# Patient Record
Sex: MALE | Race: WHITE | NOT HISPANIC OR LATINO | Employment: FULL TIME | URBAN - METROPOLITAN AREA
[De-identification: names, ages, dates, MRNs, and addresses within clinical notes are randomized per-mention and may not be internally consistent; named-entity substitution may affect disease eponyms.]

---

## 2021-09-06 LAB
ALBUMIN UR-MCNC: NEGATIVE MG/DL
ANION GAP SERPL CALCULATED.3IONS-SCNC: <1 MMOL/L (ref 3–14)
APPEARANCE UR: CLEAR
BASOPHILS # BLD AUTO: 0 10E3/UL (ref 0–0.2)
BASOPHILS NFR BLD AUTO: 0 %
BILIRUB UR QL STRIP: NEGATIVE
BUN SERPL-MCNC: 13 MG/DL (ref 7–30)
CALCIUM SERPL-MCNC: 9.2 MG/DL (ref 8.5–10.1)
CHLORIDE BLD-SCNC: 107 MMOL/L (ref 94–109)
CO2 SERPL-SCNC: 32 MMOL/L (ref 20–32)
COLOR UR AUTO: NORMAL
CREAT SERPL-MCNC: 1.08 MG/DL (ref 0.66–1.25)
EOSINOPHIL # BLD AUTO: 0.2 10E3/UL (ref 0–0.7)
EOSINOPHIL NFR BLD AUTO: 3 %
ERYTHROCYTE [DISTWIDTH] IN BLOOD BY AUTOMATED COUNT: 11.8 % (ref 10–15)
GFR SERPL CREATININE-BSD FRML MDRD: >90 ML/MIN/1.73M2
GLUCOSE BLD-MCNC: 95 MG/DL (ref 70–99)
GLUCOSE UR STRIP-MCNC: NEGATIVE MG/DL
HCT VFR BLD AUTO: 45.4 % (ref 40–53)
HGB BLD-MCNC: 15.3 G/DL (ref 13.3–17.7)
HGB UR QL STRIP: NEGATIVE
HOLD SPECIMEN: NORMAL
IMM GRANULOCYTES # BLD: 0 10E3/UL
IMM GRANULOCYTES NFR BLD: 0 %
KETONES UR STRIP-MCNC: NEGATIVE MG/DL
LEUKOCYTE ESTERASE UR QL STRIP: NEGATIVE
LYMPHOCYTES # BLD AUTO: 1.5 10E3/UL (ref 0.8–5.3)
LYMPHOCYTES NFR BLD AUTO: 25 %
MCH RBC QN AUTO: 31.3 PG (ref 26.5–33)
MCHC RBC AUTO-ENTMCNC: 33.7 G/DL (ref 31.5–36.5)
MCV RBC AUTO: 93 FL (ref 78–100)
MONOCYTES # BLD AUTO: 0.7 10E3/UL (ref 0–1.3)
MONOCYTES NFR BLD AUTO: 12 %
NEUTROPHILS # BLD AUTO: 3.5 10E3/UL (ref 1.6–8.3)
NEUTROPHILS NFR BLD AUTO: 60 %
NITRATE UR QL: NEGATIVE
NRBC # BLD AUTO: 0 10E3/UL
NRBC BLD AUTO-RTO: 0 /100
PH UR STRIP: 6 [PH] (ref 5–7)
PLATELET # BLD AUTO: 175 10E3/UL (ref 150–450)
POTASSIUM BLD-SCNC: 4.1 MMOL/L (ref 3.4–5.3)
RBC # BLD AUTO: 4.89 10E6/UL (ref 4.4–5.9)
SODIUM SERPL-SCNC: 139 MMOL/L (ref 133–144)
SP GR UR STRIP: 1.02 (ref 1–1.03)
UROBILINOGEN UR STRIP-MCNC: NORMAL MG/DL
WBC # BLD AUTO: 6 10E3/UL (ref 4–11)

## 2021-09-06 PROCEDURE — 81003 URINALYSIS AUTO W/O SCOPE: CPT | Performed by: EMERGENCY MEDICINE

## 2021-09-06 PROCEDURE — 36415 COLL VENOUS BLD VENIPUNCTURE: CPT | Performed by: EMERGENCY MEDICINE

## 2021-09-06 PROCEDURE — 80048 BASIC METABOLIC PNL TOTAL CA: CPT | Performed by: EMERGENCY MEDICINE

## 2021-09-06 PROCEDURE — 99285 EMERGENCY DEPT VISIT HI MDM: CPT | Mod: 25

## 2021-09-06 PROCEDURE — 85025 COMPLETE CBC W/AUTO DIFF WBC: CPT | Performed by: EMERGENCY MEDICINE

## 2021-09-06 PROCEDURE — C9803 HOPD COVID-19 SPEC COLLECT: HCPCS

## 2021-09-06 ASSESSMENT — MIFFLIN-ST. JEOR: SCORE: 2124.31

## 2021-09-07 ENCOUNTER — HOSPITAL ENCOUNTER (OUTPATIENT)
Facility: CLINIC | Age: 26
Setting detail: OBSERVATION
Discharge: HOME | End: 2021-09-07
Attending: EMERGENCY MEDICINE | Admitting: SURGERY
Payer: COMMERCIAL

## 2021-09-07 ENCOUNTER — ANESTHESIA EVENT (OUTPATIENT)
Dept: SURGERY | Facility: CLINIC | Age: 26
End: 2021-09-07
Payer: COMMERCIAL

## 2021-09-07 ENCOUNTER — APPOINTMENT (OUTPATIENT)
Dept: CT IMAGING | Facility: CLINIC | Age: 26
End: 2021-09-07
Attending: EMERGENCY MEDICINE
Payer: COMMERCIAL

## 2021-09-07 ENCOUNTER — ANESTHESIA (OUTPATIENT)
Dept: SURGERY | Facility: CLINIC | Age: 26
End: 2021-09-07
Payer: COMMERCIAL

## 2021-09-07 VITALS
SYSTOLIC BLOOD PRESSURE: 116 MMHG | HEART RATE: 53 BPM | WEIGHT: 242 LBS | OXYGEN SATURATION: 97 % | HEIGHT: 72 IN | DIASTOLIC BLOOD PRESSURE: 79 MMHG | TEMPERATURE: 97.3 F | BODY MASS INDEX: 32.78 KG/M2 | RESPIRATION RATE: 14 BRPM

## 2021-09-07 DIAGNOSIS — K35.30 ACUTE APPENDICITIS WITH LOCALIZED PERITONITIS, WITHOUT PERFORATION, ABSCESS, OR GANGRENE: ICD-10-CM

## 2021-09-07 LAB — SARS-COV-2 RNA RESP QL NAA+PROBE: NEGATIVE

## 2021-09-07 PROCEDURE — 370N000017 HC ANESTHESIA TECHNICAL FEE, PER MIN: Performed by: SURGERY

## 2021-09-07 PROCEDURE — 74177 CT ABD & PELVIS W/CONTRAST: CPT

## 2021-09-07 PROCEDURE — 88304 TISSUE EXAM BY PATHOLOGIST: CPT | Mod: TC | Performed by: SURGERY

## 2021-09-07 PROCEDURE — 258N000003 HC RX IP 258 OP 636: Performed by: EMERGENCY MEDICINE

## 2021-09-07 PROCEDURE — 710N000010 HC RECOVERY PHASE 1, LEVEL 2, PER MIN: Performed by: SURGERY

## 2021-09-07 PROCEDURE — 272N000001 HC OR GENERAL SUPPLY STERILE: Performed by: SURGERY

## 2021-09-07 PROCEDURE — 360N000076 HC SURGERY LEVEL 3, PER MIN: Performed by: SURGERY

## 2021-09-07 PROCEDURE — 250N000009 HC RX 250: Performed by: SURGERY

## 2021-09-07 PROCEDURE — 250N000011 HC RX IP 250 OP 636: Performed by: NURSE ANESTHETIST, CERTIFIED REGISTERED

## 2021-09-07 PROCEDURE — 250N000009 HC RX 250: Performed by: NURSE ANESTHETIST, CERTIFIED REGISTERED

## 2021-09-07 PROCEDURE — 250N000013 HC RX MED GY IP 250 OP 250 PS 637: Performed by: ANESTHESIOLOGY

## 2021-09-07 PROCEDURE — 999N000141 HC STATISTIC PRE-PROCEDURE NURSING ASSESSMENT: Performed by: SURGERY

## 2021-09-07 PROCEDURE — G0378 HOSPITAL OBSERVATION PER HR: HCPCS

## 2021-09-07 PROCEDURE — 87635 SARS-COV-2 COVID-19 AMP PRB: CPT | Performed by: EMERGENCY MEDICINE

## 2021-09-07 PROCEDURE — 250N000011 HC RX IP 250 OP 636: Performed by: EMERGENCY MEDICINE

## 2021-09-07 PROCEDURE — 44970 LAPAROSCOPY APPENDECTOMY: CPT | Performed by: SURGERY

## 2021-09-07 PROCEDURE — 96361 HYDRATE IV INFUSION ADD-ON: CPT

## 2021-09-07 PROCEDURE — 250N000013 HC RX MED GY IP 250 OP 250 PS 637: Performed by: SURGERY

## 2021-09-07 PROCEDURE — 96374 THER/PROPH/DIAG INJ IV PUSH: CPT | Mod: 59

## 2021-09-07 PROCEDURE — 710N000012 HC RECOVERY PHASE 2, PER MINUTE: Performed by: SURGERY

## 2021-09-07 PROCEDURE — 258N000003 HC RX IP 258 OP 636: Performed by: NURSE ANESTHETIST, CERTIFIED REGISTERED

## 2021-09-07 PROCEDURE — 250N000011 HC RX IP 250 OP 636: Performed by: ANESTHESIOLOGY

## 2021-09-07 RX ORDER — BUPIVACAINE HYDROCHLORIDE 5 MG/ML
INJECTION, SOLUTION EPIDURAL; INTRACAUDAL PRN
Status: DISCONTINUED | OUTPATIENT
Start: 2021-09-07 | End: 2021-09-07 | Stop reason: HOSPADM

## 2021-09-07 RX ORDER — OXYCODONE HYDROCHLORIDE 5 MG/1
5 TABLET ORAL
Status: DISCONTINUED | OUTPATIENT
Start: 2021-09-07 | End: 2021-09-07 | Stop reason: HOSPADM

## 2021-09-07 RX ORDER — HYDROMORPHONE HYDROCHLORIDE 1 MG/ML
.3-.5 INJECTION, SOLUTION INTRAMUSCULAR; INTRAVENOUS; SUBCUTANEOUS
Status: DISCONTINUED | OUTPATIENT
Start: 2021-09-07 | End: 2021-09-07 | Stop reason: HOSPADM

## 2021-09-07 RX ORDER — HYDROMORPHONE HCL IN WATER/PF 6 MG/30 ML
0.2 PATIENT CONTROLLED ANALGESIA SYRINGE INTRAVENOUS EVERY 5 MIN PRN
Status: DISCONTINUED | OUTPATIENT
Start: 2021-09-07 | End: 2021-09-07 | Stop reason: HOSPADM

## 2021-09-07 RX ORDER — ACETAMINOPHEN 325 MG/1
975 TABLET ORAL ONCE
Status: COMPLETED | OUTPATIENT
Start: 2021-09-07 | End: 2021-09-07

## 2021-09-07 RX ORDER — ONDANSETRON 2 MG/ML
4 INJECTION INTRAMUSCULAR; INTRAVENOUS EVERY 30 MIN PRN
Status: DISCONTINUED | OUTPATIENT
Start: 2021-09-07 | End: 2021-09-07 | Stop reason: HOSPADM

## 2021-09-07 RX ORDER — IBUPROFEN 200 MG
600 TABLET ORAL EVERY 6 HOURS PRN
COMMUNITY
Start: 2021-09-07 | End: 2023-02-17

## 2021-09-07 RX ORDER — FENTANYL CITRATE 50 UG/ML
25 INJECTION, SOLUTION INTRAMUSCULAR; INTRAVENOUS
Status: DISCONTINUED | OUTPATIENT
Start: 2021-09-07 | End: 2021-09-07 | Stop reason: HOSPADM

## 2021-09-07 RX ORDER — KETOROLAC TROMETHAMINE 30 MG/ML
INJECTION, SOLUTION INTRAMUSCULAR; INTRAVENOUS PRN
Status: DISCONTINUED | OUTPATIENT
Start: 2021-09-07 | End: 2021-09-07

## 2021-09-07 RX ORDER — SODIUM CHLORIDE, SODIUM LACTATE, POTASSIUM CHLORIDE, CALCIUM CHLORIDE 600; 310; 30; 20 MG/100ML; MG/100ML; MG/100ML; MG/100ML
INJECTION, SOLUTION INTRAVENOUS CONTINUOUS
Status: DISCONTINUED | OUTPATIENT
Start: 2021-09-07 | End: 2021-09-07 | Stop reason: HOSPADM

## 2021-09-07 RX ORDER — IOPAMIDOL 755 MG/ML
500 INJECTION, SOLUTION INTRAVASCULAR ONCE
Status: COMPLETED | OUTPATIENT
Start: 2021-09-07 | End: 2021-09-07

## 2021-09-07 RX ORDER — ACETAMINOPHEN 500 MG
1000 TABLET ORAL EVERY 6 HOURS PRN
COMMUNITY
Start: 2021-09-07 | End: 2023-02-17

## 2021-09-07 RX ORDER — PROCHLORPERAZINE MALEATE 10 MG
10 TABLET ORAL EVERY 6 HOURS PRN
Status: DISCONTINUED | OUTPATIENT
Start: 2021-09-07 | End: 2021-09-07 | Stop reason: HOSPADM

## 2021-09-07 RX ORDER — MEPERIDINE HYDROCHLORIDE 25 MG/ML
12.5 INJECTION INTRAMUSCULAR; INTRAVENOUS; SUBCUTANEOUS
Status: DISCONTINUED | OUTPATIENT
Start: 2021-09-07 | End: 2021-09-07 | Stop reason: HOSPADM

## 2021-09-07 RX ORDER — OXYCODONE HYDROCHLORIDE 5 MG/1
5 TABLET ORAL EVERY 4 HOURS PRN
Status: DISCONTINUED | OUTPATIENT
Start: 2021-09-07 | End: 2021-09-07 | Stop reason: HOSPADM

## 2021-09-07 RX ORDER — SODIUM CHLORIDE, SODIUM LACTATE, POTASSIUM CHLORIDE, CALCIUM CHLORIDE 600; 310; 30; 20 MG/100ML; MG/100ML; MG/100ML; MG/100ML
INJECTION, SOLUTION INTRAVENOUS CONTINUOUS PRN
Status: DISCONTINUED | OUTPATIENT
Start: 2021-09-07 | End: 2021-09-07

## 2021-09-07 RX ORDER — FENTANYL CITRATE 50 UG/ML
INJECTION, SOLUTION INTRAMUSCULAR; INTRAVENOUS PRN
Status: DISCONTINUED | OUTPATIENT
Start: 2021-09-07 | End: 2021-09-07

## 2021-09-07 RX ORDER — LIDOCAINE 40 MG/G
CREAM TOPICAL
Status: DISCONTINUED | OUTPATIENT
Start: 2021-09-07 | End: 2021-09-07 | Stop reason: HOSPADM

## 2021-09-07 RX ORDER — OXYCODONE HYDROCHLORIDE 5 MG/1
5-10 TABLET ORAL EVERY 4 HOURS PRN
Qty: 12 TABLET | Refills: 0 | Status: SHIPPED | OUTPATIENT
Start: 2021-09-07 | End: 2023-02-17

## 2021-09-07 RX ORDER — LIDOCAINE HYDROCHLORIDE 10 MG/ML
INJECTION, SOLUTION INFILTRATION; PERINEURAL PRN
Status: DISCONTINUED | OUTPATIENT
Start: 2021-09-07 | End: 2021-09-07

## 2021-09-07 RX ORDER — PROPOFOL 10 MG/ML
INJECTION, EMULSION INTRAVENOUS PRN
Status: DISCONTINUED | OUTPATIENT
Start: 2021-09-07 | End: 2021-09-07

## 2021-09-07 RX ORDER — ONDANSETRON 4 MG/1
4 TABLET, ORALLY DISINTEGRATING ORAL EVERY 30 MIN PRN
Status: DISCONTINUED | OUTPATIENT
Start: 2021-09-07 | End: 2021-09-07 | Stop reason: HOSPADM

## 2021-09-07 RX ORDER — ERTAPENEM 1 G/1
1 INJECTION, POWDER, LYOPHILIZED, FOR SOLUTION INTRAMUSCULAR; INTRAVENOUS EVERY 24 HOURS
Status: DISCONTINUED | OUTPATIENT
Start: 2021-09-07 | End: 2021-09-07 | Stop reason: HOSPADM

## 2021-09-07 RX ORDER — KETOROLAC TROMETHAMINE 30 MG/ML
15 INJECTION, SOLUTION INTRAMUSCULAR; INTRAVENOUS EVERY 6 HOURS PRN
Status: DISCONTINUED | OUTPATIENT
Start: 2021-09-07 | End: 2021-09-07 | Stop reason: HOSPADM

## 2021-09-07 RX ORDER — METOPROLOL TARTRATE 1 MG/ML
1-2 INJECTION, SOLUTION INTRAVENOUS EVERY 5 MIN PRN
Status: DISCONTINUED | OUTPATIENT
Start: 2021-09-07 | End: 2021-09-07 | Stop reason: HOSPADM

## 2021-09-07 RX ORDER — ACETAMINOPHEN 650 MG/1
650 SUPPOSITORY RECTAL ONCE
Status: DISCONTINUED | OUTPATIENT
Start: 2021-09-07 | End: 2021-09-07 | Stop reason: HOSPADM

## 2021-09-07 RX ORDER — ONDANSETRON 4 MG/1
4 TABLET, ORALLY DISINTEGRATING ORAL EVERY 6 HOURS PRN
Status: DISCONTINUED | OUTPATIENT
Start: 2021-09-07 | End: 2021-09-07 | Stop reason: HOSPADM

## 2021-09-07 RX ORDER — ACETAMINOPHEN 325 MG/1
650 TABLET ORAL
Status: DISCONTINUED | OUTPATIENT
Start: 2021-09-07 | End: 2021-09-07 | Stop reason: HOSPADM

## 2021-09-07 RX ORDER — PROCHLORPERAZINE 25 MG
25 SUPPOSITORY, RECTAL RECTAL EVERY 12 HOURS PRN
Status: DISCONTINUED | OUTPATIENT
Start: 2021-09-07 | End: 2021-09-07 | Stop reason: HOSPADM

## 2021-09-07 RX ORDER — DEXAMETHASONE SODIUM PHOSPHATE 4 MG/ML
INJECTION, SOLUTION INTRA-ARTICULAR; INTRALESIONAL; INTRAMUSCULAR; INTRAVENOUS; SOFT TISSUE PRN
Status: DISCONTINUED | OUTPATIENT
Start: 2021-09-07 | End: 2021-09-07

## 2021-09-07 RX ORDER — ONDANSETRON 2 MG/ML
4 INJECTION INTRAMUSCULAR; INTRAVENOUS EVERY 6 HOURS PRN
Status: DISCONTINUED | OUTPATIENT
Start: 2021-09-07 | End: 2021-09-07 | Stop reason: HOSPADM

## 2021-09-07 RX ORDER — ONDANSETRON 2 MG/ML
INJECTION INTRAMUSCULAR; INTRAVENOUS PRN
Status: DISCONTINUED | OUTPATIENT
Start: 2021-09-07 | End: 2021-09-07

## 2021-09-07 RX ADMIN — SODIUM CHLORIDE, SODIUM LACTATE, POTASSIUM CHLORIDE, CALCIUM CHLORIDE: 600; 310; 30; 20 INJECTION, SOLUTION INTRAVENOUS at 05:59

## 2021-09-07 RX ADMIN — ERTAPENEM SODIUM 1 G: 1 INJECTION, POWDER, LYOPHILIZED, FOR SOLUTION INTRAMUSCULAR; INTRAVENOUS at 02:07

## 2021-09-07 RX ADMIN — HYDROMORPHONE HYDROCHLORIDE 1 MG: 1 INJECTION, SOLUTION INTRAMUSCULAR; INTRAVENOUS; SUBCUTANEOUS at 06:18

## 2021-09-07 RX ADMIN — FENTANYL CITRATE 25 MCG: 50 INJECTION, SOLUTION INTRAMUSCULAR; INTRAVENOUS at 07:44

## 2021-09-07 RX ADMIN — DEXAMETHASONE SODIUM PHOSPHATE 4 MG: 4 INJECTION, SOLUTION INTRA-ARTICULAR; INTRALESIONAL; INTRAMUSCULAR; INTRAVENOUS; SOFT TISSUE at 05:38

## 2021-09-07 RX ADMIN — FENTANYL CITRATE 100 MCG: 50 INJECTION, SOLUTION INTRAMUSCULAR; INTRAVENOUS at 05:38

## 2021-09-07 RX ADMIN — ONDANSETRON HYDROCHLORIDE 4 MG: 2 INJECTION, SOLUTION INTRAVENOUS at 05:52

## 2021-09-07 RX ADMIN — SODIUM CHLORIDE 65 ML: 9 INJECTION, SOLUTION INTRAVENOUS at 00:59

## 2021-09-07 RX ADMIN — OXYCODONE HYDROCHLORIDE 5 MG: 5 TABLET ORAL at 07:40

## 2021-09-07 RX ADMIN — PROPOFOL 200 MG: 10 INJECTION, EMULSION INTRAVENOUS at 05:38

## 2021-09-07 RX ADMIN — SUGAMMADEX 200 MG: 100 INJECTION, SOLUTION INTRAVENOUS at 06:15

## 2021-09-07 RX ADMIN — KETOROLAC TROMETHAMINE 30 MG: 30 INJECTION, SOLUTION INTRAMUSCULAR at 06:15

## 2021-09-07 RX ADMIN — SODIUM CHLORIDE, SODIUM LACTATE, POTASSIUM CHLORIDE, CALCIUM CHLORIDE: 600; 310; 30; 20 INJECTION, SOLUTION INTRAVENOUS at 05:16

## 2021-09-07 RX ADMIN — IOPAMIDOL 100 ML: 755 INJECTION, SOLUTION INTRAVENOUS at 00:59

## 2021-09-07 RX ADMIN — MIDAZOLAM 2 MG: 1 INJECTION INTRAMUSCULAR; INTRAVENOUS at 05:31

## 2021-09-07 RX ADMIN — ROCURONIUM BROMIDE 50 MG: 10 INJECTION INTRAVENOUS at 05:38

## 2021-09-07 RX ADMIN — LIDOCAINE HYDROCHLORIDE 50 MG: 10 INJECTION, SOLUTION INFILTRATION; PERINEURAL at 05:38

## 2021-09-07 RX ADMIN — ACETAMINOPHEN 975 MG: 325 TABLET, FILM COATED ORAL at 07:40

## 2021-09-07 ASSESSMENT — ENCOUNTER SYMPTOMS
NAUSEA: 1
APPETITE CHANGE: 1
FEVER: 1
BACK PAIN: 0
ABDOMINAL PAIN: 1
VOMITING: 0

## 2021-09-07 ASSESSMENT — MIFFLIN-ST. JEOR: SCORE: 2110.7

## 2021-09-07 NOTE — ANESTHESIA CARE TRANSFER NOTE
Patient: Jason Sanchez    Procedure(s):  APPENDECTOMY, LAPAROSCOPIC    Diagnosis: Acute appendicitis with localized peritonitis, without perforation, abscess, or gangrene [K35.30]  Diagnosis Additional Information: No value filed.    Anesthesia Type:   General     Note:    Oropharynx: oropharynx clear of all foreign objects and spontaneously breathing  Level of Consciousness: awake  Oxygen Supplementation: face mask  Level of Supplemental Oxygen (L/min / FiO2): 6  Independent Airway: airway patency satisfactory and stable  Dentition: dentition unchanged  Vital Signs Stable: post-procedure vital signs reviewed and stable  Report to RN Given: handoff report given  Patient transferred to: PACU    Handoff Report: Identifed the Patient, Identified the Reponsible Provider, Reviewed the pertinent medical history, Discussed the surgical course, Reviewed Intra-OP anesthesia mangement and issues during anesthesia, Set expectations for post-procedure period and Allowed opportunity for questions and acknowledgement of understanding      Vitals:  Vitals Value Taken Time   /107 09/07/21 0629   Temp     Pulse 57 09/07/21 0631   Resp 8 09/07/21 0631   SpO2 99 % 09/07/21 0631   Vitals shown include unvalidated device data.    Electronically Signed By: KODAK Joyner CRNA  September 7, 2021  6:32 AM

## 2021-09-07 NOTE — ED PROVIDER NOTES
History     Chief Complaint:  Abdominal Pain      The history is provided by the patient.      Jason Sanchez is a 26 year old male who presents with right lower abdominal pain since 2 nights ago. The severity and location of the pain varies at times. He also notes that he has lost his appetite, and has had difficulty sleeping the past 2 nights due to the pain. His is currently a little nausea, but denies vomiting. He denies recent trauma to the abdomen or a history of abdominal surgeries. There is no association of urinary symptoms, testicular pain, or back pain.       Review of Systems   Constitutional: Positive for appetite change and fever.   Gastrointestinal: Positive for abdominal pain and nausea. Negative for vomiting.   Genitourinary: Negative.  Negative for testicular pain.   Musculoskeletal: Negative for back pain.   All other systems reviewed and are negative.        Allergies:  No Known Allergies    Medications:    Patient denies current use of medications.     Past Medical History:    ADHD   Varicella     Past Surgical History:    Forearm fracture surgery     Family History:    Brother - ADHD     Social History:  Patient was unaccompanied to the ED.  Hx of alcohol use.     Physical Exam     Patient Vitals for the past 24 hrs:   BP Temp Temp src Pulse Resp SpO2 Height Weight   09/06/21 2204 (!) 137/102 97.8  F (36.6  C) Temporal 68 18 97 % 1.829 m (6') 111.1 kg (245 lb)       Physical Exam  Gen: alert  HEENT: PERRL, oropharynx clear  Neck: normal ROM  CV: RRR, no murmurs  Pulm: breath sounds equal, lungs clear  Abd: Soft, Focal right abdominal tenderness.   Back: no evidence of injury, no cva tenderness  MSK: no deformity, moves all extremities  Skin: no rash  Neuro: alert, appropriate conversation and interaction    Emergency Department Course   Imaging:  CT Abdomen Pelvis w Contrast  Final Result  IMPRESSION:   1.  Appendix appears slightly dilated measuring 8 mm and appears slightly thick-walled  with some minimal adjacent edema. Although not definitive, findings could be seen with a mild or low-grade appendicitis in the appropriate setting. Clinical   correlation.  2.  No significant findings elsewhere.  Case discussed with Dr. Biswas 9/7/2021 1:31 AM.  Per Radiology     Laboratory:  BMP: Anion gap <1 (L) o/w WNL (Creatinine 1.08)     CBC: WBC 6, HGB 15.3,      UA with microscopic: WNL     Asymptomatic COVID-19 Virus (Coronavirus) by PCR Nasopharyngeal swab: Pending     Emergency Department Course:    Reviewed:  I reviewed nursing notes, vitals, past history and care everywhere    Assessments:  0003 I obtained history and examined the patient as noted above.    I rechecked the patient and explained findings.     Consults:   0130 I consulted with Radiology.   0137 I consulted with Dr. Vargas from surgery.     Interventions:  0059 Ns 65ml IV  0207 Invanz 1g IV  NS 1L iv    Disposition:  The patient was admitted to the hospital under the care of Dr. Vargas.    Impression & Plan    Medical Decision Making:  Jason Sanchez is a 26 year old male who presents with abdominal pain and the CT scan confirms appendicitis.  This is consistent with his clinical exam.  There is no evidence of rupture or abscess at this time. His pain has been controlled with interventions in the Emergency Department.  IV antibiotics started in the Emergency Department. The case was discussed with the on-call surgeon and the patient will be going to the operating room.      Covid-19  Jason Sanchez was evaluated during a global COVID-19 pandemic, which necessitated consideration that the patient might be at risk for infection with the SARS-CoV-2 virus that causes COVID-19.   Applicable protocols for evaluation were followed during the patient's care.   COVID-19 was considered as part of the patient's evaluation. The plan for testing is:  a test was obtained during this visit.    Diagnosis:    ICD-10-CM    1. Acute appendicitis  with localized peritonitis, without perforation, abscess, or gangrene  K35.30 Case Request: APPENDECTOMY, LAPAROSCOPIC     Case Request: APPENDECTOMY, LAPAROSCOPIC       Scribe Disclosure:  I, Adan James, am serving as a scribe at 12:03 AM on 9/7/2021 to document services personally performed by Nela Biswas MD based on my observations and the provider's statements to me.      Nela Biswas MD  09/07/21 0908

## 2021-09-07 NOTE — ANESTHESIA POSTPROCEDURE EVALUATION
Patient: Jason Sanchez    Procedure(s):  APPENDECTOMY, LAPAROSCOPIC    Diagnosis:Acute appendicitis with localized peritonitis, without perforation, abscess, or gangrene [K35.30]  Diagnosis Additional Information: No value filed.    Anesthesia Type:  General    Note:  Disposition: Outpatient   Postop Pain Control: Uneventful            Sign Out: Well controlled pain   PONV: No   Neuro/Psych: Uneventful            Sign Out: Acceptable/Baseline neuro status   Airway/Respiratory: Uneventful            Sign Out: Acceptable/Baseline resp. status   CV/Hemodynamics: Uneventful            Sign Out: Acceptable CV status; No obvious hypovolemia; No obvious fluid overload   Other NRE: NONE   DID A NON-ROUTINE EVENT OCCUR? No           Last vitals:  Vitals Value Taken Time   /107 09/07/21 0630   Temp 97  F (36.1  C) 09/07/21 0630   Pulse 55 09/07/21 0639   Resp 16 09/07/21 0639   SpO2 94 % 09/07/21 0639   Vitals shown include unvalidated device data.    Electronically Signed By: Francisco Glaser MD  September 7, 2021  6:40 AM

## 2021-09-07 NOTE — H&P
[facility]  General Surgery Consult    Jason Sanchez MRN# [id number]   Age: 26 year old YOB: 1995     HPI:  History is obtained from the patient. Jason Sanchez is a 26 year old year old patient who has been experiencing acute periumbilical and RLQ pain for the past 2 days associated with nausea,m subjective fever, and anorexia.  Negative for associated vomiting. No similar episodes of pain in the past.     Review Of Systems:  The 10 point review of systems is negative other than noted in the HPI.    PMH:  History reviewed. No pertinent past medical history.    PSH:  History reviewed. No pertinent surgical history.    Allergies:  No Known Allergies    Home Medications:  No current outpatient medications on file.       Social History:  Social History     Tobacco Use     Smoking status: Never Smoker     Smokeless tobacco: Never Used   Substance Use Topics     Alcohol use: None     Drug use: None       Family History:  No family history chronic diarrhea, inflammatory bowel disease or colon cancer.  No bleeding disorders, clotting disorders, or problems with anesthesia.    Physical Exam:  BP (!) 137/102 (BP Location: Right arm)   Pulse 68   Temp 97.8  F (36.6  C) (Temporal)   Resp 20   Ht 1.829 m (6')   Wt 109.8 kg (242 lb)   SpO2 97%   BMI 32.82 kg/m    General appearance: Resting Comfortably in bed, no apparent distress  Lungs: Breathing comfortably on room air  Heart: Regular rate and rhythm'  Abdomen: Soft, nondistended, tender to palpation in RLQ with guarding, no palpable masses or hernias  Extremities: Without clubbing, cyanosis, edema  Neurologic: Grossly intact times four extremities, alert and oriented times three  Psychiatric: Mood and affect are appropriate  Skin: Without lesions or rashes      Labs Reviewed:  Lab Results   Component Value Date    WBC 6.0 09/06/2021     Lab Results   Component Value Date    HGB 15.3 09/06/2021     Lab Results   Component Value Date      09/06/2021     Last Basic Metabolic Panel:  Lab Results   Component Value Date     09/06/2021      Lab Results   Component Value Date    POTASSIUM 4.1 09/06/2021     Lab Results   Component Value Date    CHLORIDE 107 09/06/2021     Lab Results   Component Value Date    DELMI 9.2 09/06/2021     Lab Results   Component Value Date    CO2 32 09/06/2021     Lab Results   Component Value Date    BUN 13 09/06/2021     Lab Results   Component Value Date    CR 1.08 09/06/2021     Lab Results   Component Value Date    GLC 95 09/06/2021       Radiology:  All imaging studies reviewed by me.    Results for orders placed or performed during the hospital encounter of 09/07/21   CT Abdomen Pelvis w Contrast    Narrative    EXAM: CT ABDOMEN PELVIS W CONTRAST  LOCATION: [facility]  DATE/TIME: 9/7/2021 12:57 AM    INDICATION: RLQ abdominal pain, appendicitis suspected (Age >= 14y)  COMPARISON: None.  TECHNIQUE: CT scan of the abdomen and pelvis was performed following injection of IV contrast. Multiplanar reformats were obtained. Dose reduction techniques were used.  CONTRAST: 100mL Isovue-370    FINDINGS:   LOWER CHEST: Normal.    HEPATOBILIARY: Normal.    PANCREAS: Normal.    SPLEEN: Normal.    ADRENAL GLANDS: Normal.    KIDNEYS/BLADDER: Several benign bilateral renal cysts with the largest in the left measuring up to 3.5 cm. No specific follow-up needed. Kidneys are otherwise negative. No hydronephrosis. Bladder is grossly negative.    BOWEL: Appendix is slightly dilated measuring 8 mm in thickness. Appendix also appears slightly thick-walled with some minimal adjacent edema. Findings could be seen with a mild or low-grade acute appendicitis in the appropriate clinical setting.   Clinical correlation. No evidence for abscess, free air, or bowel obstruction. Bowel elsewhere is unremarkable.    LYMPH NODES: Normal.    VASCULATURE: Unremarkable.    PELVIC ORGANS: Normal.    MUSCULOSKELETAL: Normal.       Impression    IMPRESSION:   1.  Appendix appears slightly dilated measuring 8 mm and appears slightly thick-walled with some minimal adjacent edema. Although not definitive, findings could be seen with a mild or low-grade appendicitis in the appropriate setting. Clinical   correlation.    2.  No significant findings elsewhere.    Case discussed with Dr. Biswas 9/7/2021 1:31 AM.         ASSESSMENT/PLAN:  The patient's history, physical exam, laboratory and imaging studies are suspicious for acute appendicitis.  I have offered the patient a laparoscopic appendectomy.      We have had a detailed discussion of nature of appendicitis, the procedure, its risks, benefits, alternatives, recovery, postop limitations, anesthesia, bleeding, blood transfusion,  DVT, PE, postoperative infections, injury to adjacent organs and structures, open conversion, bowel resection, prolonged convalescence in the event of gangrene or perforation of the appendix, abdominal wall hernia, intraabdominal adhesions which can lead to bowel obstruction.  We have discussed interventions and treatment for these complications.  The patient understands the possibility of a diagnosis other than appendicitis.  All questions have been answered to the best of my ability.      This case was discussed with ED physician, Dr. Biswas.    He elects to proceed.      Pre-operative antibiotics have been given.     Alise Vargas MD    Time spent with the patient, reviewing the EMR, reviewing laboratory and imaging studies, more than 50% of which was counseling and coordinating care:  45 minutes.

## 2021-09-07 NOTE — OP NOTE
General Surgery Operative Note      Pre-operative diagnosis: acute appendicitis   Post-operative diagnosis: acute appendicitis    Procedure: laparoscopic appendectomy   Surgeon: Alise Vargas MD   Assistant(s): None   Anesthesia: General    Estimated blood loss:  Complications: 5 ml  None   Specimens: ID Type Source Tests Collected by Time Destination   1 : appendix Tissue Appendix SURGICAL PATHOLOGY EXAM Alise Vargas MD 9/7/2021  6:07 AM         INDICATION FOR PROCEDURE: This is a 26 year old male who presented with abdominal pain of 2 day's duration. CT scan of the abdomen was consistent with acute appendicitis without evidence for abscess or perforation. We discussed operative management of appendicitis including risks and benefits, and the patient agreed to proceed.    DESCRIPTION OF PROCEDURE:  The patient was placed on the table in supine position.  General endotracheal anesthesia was induced and the abdomen was prepped and draped in standard sterile fashion.  An infraumbilical incision was made and a 12 mm Clarissa Trocar was placed under direct visualization.  Pneumoperitoneum was established and the abdomen was surveyed. A 5 mm trocar was placed in the suprapubic region, and a 5 mm trocar was placed in the left lower quadrant.  The patient was placed in Trendelenburg and right side up.  The appendix was identified in the right lower quadrant.  It appeared erythematous and dilated.  A window was created in the mesoappendix at the appendiceal base. The base of the appendix was healthy-appearing and was divided using a white load of the endo-MERON stapler.The mesoappendix was divided using a white load of the endo-MERON stapler. The appendix was passed into an Endocatch bag and removed through the 12mm trocar site.  The right lower quadrant was inspected for hemostasis.  Hemostasis was assured.  We irrigated with sterile saline and aspirated the effluent.  The two 5 mm trocars were removed under direct  visualization to ensure no bleeding from port sites. The abdomen was evacuated of CO2.  The 12mm port site fascia was closed with 0 vicryl.  The skin of all 3 incisions was anesthetized with local anesthetic and closed with interrupted 4-0 Vicryl subcuticular sutures and skin glue.  The patient tolerated the procedure well.  Sponge and instrument counts were correct.    FINDINGS: Non-ruptured appendicitis    Alise Vargas MD

## 2021-09-07 NOTE — ANESTHESIA PREPROCEDURE EVALUATION
Anesthesia Pre-Procedure Evaluation    Patient: Jason Sanchez   MRN: [id number] : 1995        Preoperative Diagnosis: Acute appendicitis with localized peritonitis, without perforation, abscess, or gangrene [K35.30]   Procedure : Procedure(s):  APPENDECTOMY, LAPAROSCOPIC     History reviewed. No pertinent past medical history.   History reviewed. No pertinent surgical history.   No Known Allergies   Social History     Tobacco Use     Smoking status: Never Smoker     Smokeless tobacco: Never Used   Substance Use Topics     Alcohol use: Not on file      Wt Readings from Last 1 Encounters:   21 109.8 kg (242 lb)        Anesthesia Evaluation   Pt has not had prior anesthetic         ROS/MED HX  ENT/Pulmonary:  - neg pulmonary ROS  (-) sleep apnea   Neurologic:       Cardiovascular:  - neg cardiovascular ROS     METS/Exercise Tolerance:     Hematologic:       Musculoskeletal:       GI/Hepatic:     (+) appendicitis,     Renal/Genitourinary:       Endo:     (+) Obesity,     Psychiatric/Substance Use:       Infectious Disease:       Malignancy:       Other:            Physical Exam    Airway        Mallampati: II   TM distance: > 3 FB   Neck ROM: full     Respiratory Devices and Support         Dental           Cardiovascular          Rhythm and rate: regular and normal     Pulmonary           breath sounds clear to auscultation           OUTSIDE LABS:  CBC:   Lab Results   Component Value Date    WBC 6.0 2021    HGB 15.3 2021    HCT 45.4 2021     2021     BMP:   Lab Results   Component Value Date     2021    POTASSIUM 4.1 2021    CHLORIDE 107 2021    CO2 32 2021    BUN 13 2021    CR 1.08 2021    GLC 95 2021     COAGS: No results found for: PTT, INR, FIBR  POC: No results found for: BGM, HCG, HCGS  HEPATIC: No results found for: ALBUMIN, PROTTOTAL, ALT, AST, GGT, ALKPHOS, BILITOTAL, BILIDIRECT, ARACELI  OTHER:   Lab Results    Component Value Date    DELMI 9.2 09/06/2021       Anesthesia Plan    ASA Status:  2   NPO Status:  NPO Appropriate    Anesthesia Type: General.     - Airway: ETT   Induction: Intravenous.   Maintenance: Balanced.        Consents    Anesthesia Plan(s) and associated risks, benefits, and realistic alternatives discussed. Questions answered and patient/representative(s) expressed understanding.     - Discussed with:  Patient         Postoperative Care    Pain management: IV analgesics, Oral pain medications, Multi-modal analgesia.   PONV prophylaxis: Ondansetron (or other 5HT-3), Dexamethasone or Solumedrol     Comments:                Francisco Glaser MD

## 2021-09-07 NOTE — ED TRIAGE NOTES
Here for concern of right lower abdominal pain on Saturday night associated with night sweats, nausea but no vomit, loss of appetite. Stated got his covid vaccine on Friday. ABCs intact.

## 2021-09-07 NOTE — ANESTHESIA PROCEDURE NOTES
Airway       Patient location during procedure: OR  Staff -        CRNA: Gisella Helms APRN CRNA       Performed By: CRNA  Consent for Airway        Urgency: elective  Indications and Patient Condition       Indications for airway management: barbara-procedural       Induction type:intravenous       Mask difficulty assessment: 1 - vent by mask    Final Airway Details       Final airway type: endotracheal airway       Successful airway: ETT - single  Endotracheal Airway Details        ETT size (mm): 8.0       Cuffed: yes       Successful intubation technique: direct laryngoscopy       DL Blade Type: Sorenson 2       Grade View of Cords: 1       Adjucts: stylet       Position: Right       Measured from: gums/teeth       Secured at (cm): 24       Bite block used: None    Post intubation assessment        Placement verified by: capnometry, equal breath sounds and chest rise        Number of attempts at approach: 1       Number of other approaches attempted: 0       Secured with: plastic tape       Ease of procedure: easy       Dentition: Intact

## 2021-09-07 NOTE — DISCHARGE INSTRUCTIONS
GENERAL ANESTHESIA OR SEDATION ADULT DISCHARGE INSTRUCTIONS   SPECIAL PRECAUTIONS FOR 24 HOURS AFTER SURGERY    IT IS NOT UNUSUAL TO FEEL LIGHT-HEADED OR FAINT, UP TO 24 HOURS AFTER SURGERY OR WHILE TAKING PAIN MEDICATION.  IF YOU HAVE THESE SYMPTOMS; SIT FOR A FEW MINUTES BEFORE STANDING AND HAVE SOMEONE ASSIST YOU WHEN YOU GET UP TO WALK OR USE THE BATHROOM.    YOU SHOULD REST AND RELAX FOR THE NEXT 24 HOURS AND YOU MUST MAKE ARRANGEMENTS TO HAVE SOMEONE STAY WITH YOU FOR AT LEAST 24 HOURS AFTER YOUR DISCHARGE.  AVOID HAZARDOUS AND STRENUOUS ACTIVITIES.  DO NOT MAKE IMPORTANT DECISIONS FOR 24 HOURS.    DO NOT DRIVE ANY VEHICLE OR OPERATE MECHANICAL EQUIPMENT FOR 24 HOURS FOLLOWING THE END OF YOUR SURGERY.  EVEN THOUGH YOU MAY FEEL NORMAL, YOUR REACTIONS MAY BE AFFECTED BY THE MEDICATION YOU HAVE RECEIVED.    DO NOT DRINK ALCOHOLIC BEVERAGES FOR 24 HOURS FOLLOWING YOUR SURGERY.    DRINK CLEAR LIQUIDS (APPLE JUICE, GINGER ALE, 7-UP, BROTH, ETC.).  PROGRESS TO YOUR REGULAR DIET AS YOU FEEL ABLE.    YOU MAY HAVE A DRY MOUTH, A SORE THROAT, MUSCLES ACHES OR TROUBLE SLEEPING.  THESE SHOULD GO AWAY AFTER 24 HOURS.    CALL YOUR DOCTOR FOR ANY OF THE FOLLOWING:  SIGNS OF INFECTION (FEVER, GROWING TENDERNESS AT THE SURGERY SITE, A LARGE AMOUNT OF DRAINAGE OR BLEEDING, SEVERE PAIN, FOUL-SMELLING DRAINAGE, REDNESS OR SWELLING.    IT HAS BEEN OVER 8 TO 10 HOURS SINCE SURGERY AND YOU ARE STILL NOT ABLE TO URINATE (PASS WATER).     HOME CARE FOLLOWING APPENDECTOMY  SUSAN Gamble, CELI Chaves, GEORGINA Jauregui &  EYAD Duran    INCISIONAL CARE:  Replace the bandage over your incision(s) until all drainage stops, or if more comfortable to have in place.  If present, leave the steri-strips (white paper tapes) in place for 14 days after surgery.  If Dermabond (a type of skin glue) is present, leave in place until it wears/flakes off (2-3 weeks).     BATHING:  OK to shower 48 hours after surgery.  Avoid baths for  "1 week after surgery.  You may wash your hair at any time.  Gently pat your incision dry after bathing.  Do not apply lotions, creams, or ointments to incisions.    ACTIVITY:  Light Activity -- you may immediately be up and about as tolerated.  Walking is encouraged, increase as tolerated.  Driving/Light Work-- when comfortable and off narcotic pain medications.  Strenuous Work/Activity -- limit lifting to 20 pounds for 2 weeks.  Progressively increase with time.  Active Sports (running, biking, etc.) -- cautiously resume after 2 weeks.    DISCOMFORT:  Local anesthetic placed at surgery should provide relief for 4-8 hours.  Begin taking pain pills before discomfort is severe.  Take the pain medication with some food, when possible, to minimize side effects.  Intermittent use of ice packs may help during the first 1-3 weeks after surgery.  Expect gradual improvement.    Over-the-counter anti-inflammatory medications (i.e. Ibuprofen/Advil/Motrin or Naprosyn/Aleve) may be used per package instructions in addition to or while tapering off the narcotic pain medications to decrease swelling and sensitivity.  DO NOT TAKE these Anti-inflammatory medications if your primary physician has advised against doing so, or if you have acid reflux, ulcer, or bleeding disorder, or take blood-thinner medications.  Call your primary physician or the surgery office if you have medication questions.  You may have decreased energy level for 1-2 weeks after surgery related to your recovery.    DIET:  Start with liquids and gradually resume your regular diet as tolerated.  Consider eating yogurt or taking a probiotic to help your \"gut mony\" (good bacteria in the bowel/colon) return to normal while taking or after receiving antibiotics.  Drink plenty of fluids.  While taking pain medications, consider use of a stool softener, increase your fiber in your diet, or add a fiber supplement (like Metamucil, Citrucel) to help prevent constipation " - a possible side effect of pain medications.    NAUSEA:  If nauseated from the anesthetic/pain meds; rest in bed, get up cautiously with assistance, and drink clear liquids (juice, tea, broth).    FOLLOW-UP AFTER SURGERY:  -Our office will contact you approximately 2-3 weeks after surgery to check on your progress and answer any questions you may have.  If you are doing well, you will not need to return for an office appointment.  If any concerns are identified over the phone, we will help you make an appointment to see a provider.    -If you have not received a phone call, have any questions or concerns, or would like to be seen, please call us at [phone].  We are located at: [address], [address]; [place] [address]    -CONTACT US IF THE FOLLOWING DEVELOPS:   1. A fever that is above 101     2. Increased redness, warmth, drainage, bleeding, or swelling.   3. Pain that is not relieved by rest/ice and your prescription.   4.  Increasing pain after 48 hours.   5. Drainage that is thick, cloudy, yellow, green or white.   6. Any other questions or concerns.    FREQUENTLY ASKED QUESTIONS AFTER SURGERY  SUSAN Gamble, CHRIS Segura, CELI Pang, RSydnie Jauregui  & EYAD Duran      Q:  How should my incision look?    A:  Normally your incision will appear slightly swollen with light redness directly along the incision itself as it heals.  It may feel like a bump or ridge as the healing/scarring happens, and over time (3-4 months) this bump or ridge feeling should slowly go away.  In general, clear or pink watery drainage can be normal at first as your incision heals, but should decrease over time.    Q:  How do I know if my incision is infected?  A:  Look at your incision for signs of infection, like redness around the incision spreading to surrounding skin, or drainage of cloudy or foul-smelling drainage.  If you feel warm, check your temperature to see if you are running a fever.    **If any of  these things occur, please notify the nurse at our office.  We may need you to come into the office for an incision check.      Q:  How do I take care of my incision?  A:  If you have a dressing in place - Starting the day after surgery, replace the dressing 1-2 times a day until there is no further drainage from the incision.  At that time, a dressing is no longer needed.  Try to minimize tape on the skin if irritation is occurring at the tape sites.  If you have significant irritation from tape on the skin, please call the office to discuss other method of dressing your incision.    Small pieces of tape called  steri-strips  may be present directly overlying your incision; these may be removed 10 days after surgery unless otherwise specified by your surgeon.  If these tapes start to loosen at the ends, you may trim them back until they fall off or are removed.    A:  If you had  Dermabond  tissue glue used as a dressing (this causes your incision to look shiny with a clear covering over it) - This type of dressing wears off with time and does not require more dressings over the top unless it is draining around the glue as it wears off.  Do not apply ointments or lotions over the incisions until the glue has completely worn off.    Q:  There is a piece of tape or a sticky  lead  still on my skin.  Can I remove this?  A:  Sometimes the sticky  leads  used for monitoring during surgery or for evaluation in the emergency department are not all removed while you are in the hospital.  These sometimes have a tab or metal dot on them.  You can easily remove these on your own, like taking off a band-aid.  If there is a gel substance under the  lead , simply wipe/clean it off with a washcloth or paper towel.      Q:  What can I do to minimize constipation (very hard stools, or lack of stools)?  A:  Stay well hydrated.  Increase your dietary fiber intake or take a fiber supplement -with plenty of water.  Walk around  frequently.  You may consider an over-the-counter stool-softener.  Your Pharmacist can assist you with choosing one that is stocked at your pharmacy.  Constipation is also one of the most common side effects of pain medication.  If you are using pain medication, be pro-active and try to PREVENT problems with constipation by taking the steps above BEFORE constipation becomes a problem.    Q:  What do I do if I need more pain medications?  A:  Call the office to receive refills.  Be aware that certain pain meds cannot be called into a pharmacy and actually require a paper prescription.  A change may be made in your pain med as you progress thru your recovery period or if you have side effects to certain meds.    --Pain meds are NOT refilled after 5pm on weekdays, and NOT AT ALL on the weekends, so please look ahead to prevent problems.                  Q:  Why am I having a hard time sleeping now that I am at home?  A:  Many medications you receive while you are in the hospital can impact your sleep for a number of days after your surgery/hospitalization.  Decreased level of activity and naps during the day may also make sleeping at night difficult.  Try to minimize day-time naps, and get up frequently during the day to walk around your home during your recovery time.  Sleep aides may be of some help, but are not recommended for long-term use.      Q:  I am having some back discomfort.  What should I do?  A:  This may be related to certain positioning that was required for your surgery, extended periods of time in bed, or other changes in your overall activity level.  You may try ice, heat, acetaminophen, or ibuprofen to treat this temporarily.  Note that many pain medications have acetaminophen in them and would state this on the prescription bottle.  Be sure not to exceed the maximum of 4000mg per day of acetaminophen.     **If the pain you are having does not resolve, is severe, or is a flare of back pain you have  had on other occasions prior to surgery, please contact your primary physician for further recommendations or for an appointment to be examined at their office.    Q:  Why am I having headaches?  A:  Headaches can be caused by many things:  caffeine withdrawal, use of pain meds, dehydration, high blood pressure, lack of sleep, over-activity/exhaustion, flare-up of usual migraine headaches.  If you feel this is related to muscle tension (a band-like feeling around the head, or a pressure at the low-back of the head) you may try ice or heat to this area.  You may need to drink more fluids (try electrolyte drink like Gatorade), rest, or take your usual migraine medications.   **If your headaches do not resolve, worsen, are accompanied by other symptoms, or if your blood pressure is high, please call your primary physician for recommendation and/or examination.    Q:  I am unable to urinate.  What do I do?  A:  A small percentage of people can have difficulty urinating initially after surgery.  This includes being able to urinate only a very small amount at a time and feeling discomfort or pressure in the very low abdomen.  This is called  urinary retention , and is actually an urgent situation.  Proceed to your nearest Emergency department for evaluation (not an Urgent Care Center).  Sometimes the bladder does not work correctly after certain medications you receive during surgery, or related to certain procedures.  You may need to have a catheter placed until your bladder recovers.  When planning to go to an Emergency department, it may help to call the ER to let them know you are coming in for this problem after a surgery.  This may help you get in quicker to be evaluated.  **If you have symptoms of a urinary tract infection, please contact your primary physician for the proper evaluation and treatment.    Maximum acetaminophen (Tylenol) dose from all sources should not exceed 4 grams (4000 mg) per day.  You had  tylenol 975 mgs at 0740    You received Toradol, an IV form of ibuprofen (Motrin) at 0615.  Do not take any ibuprofen products until 1215.                            If you have other questions, please call the office Monday thru Friday between 8am and 5pm to discuss with the nurse or physician assistant.  #[phone]    There is a surgeon ON CALL on weekday evenings and over the weekend in case of urgent need only, and may be contacted at the same number.    If you are having an emergency, call 911 or proceed to your nearest emergency department.

## 2021-09-08 LAB
PATH REPORT.COMMENTS IMP SPEC: NORMAL
PATH REPORT.COMMENTS IMP SPEC: NORMAL
PATH REPORT.FINAL DX SPEC: NORMAL
PATH REPORT.GROSS SPEC: NORMAL
PATH REPORT.MICROSCOPIC SPEC OTHER STN: NORMAL
PATH REPORT.MICROSCOPIC SPEC OTHER STN: NORMAL
PHOTO IMAGE: NORMAL

## 2021-09-08 PROCEDURE — 88304 TISSUE EXAM BY PATHOLOGIST: CPT | Mod: 26 | Performed by: PATHOLOGY

## 2021-09-21 ENCOUNTER — TELEPHONE (OUTPATIENT)
Dept: SURGERY | Facility: CLINIC | Age: 26
End: 2021-09-21

## 2021-09-21 DIAGNOSIS — Z98.890 POSTOPERATIVE STATE: Primary | ICD-10-CM

## 2021-09-21 NOTE — TELEPHONE ENCOUNTER
[facility]   Postoperative Follow-up Phone Call  -Call to patient to review recent procedure and recovery    Attempted to call patient for post op check.  No answer.  Message was left for patient to call back if they had any questions of concerns.     Nelly Pekc PA-C

## 2023-02-17 ENCOUNTER — OFFICE VISIT (OUTPATIENT)
Dept: FAMILY MEDICINE | Facility: CLINIC | Age: 28
End: 2023-02-17

## 2023-02-17 VITALS
WEIGHT: 247 LBS | OXYGEN SATURATION: 99 % | DIASTOLIC BLOOD PRESSURE: 88 MMHG | BODY MASS INDEX: 32.74 KG/M2 | SYSTOLIC BLOOD PRESSURE: 110 MMHG | HEIGHT: 73 IN | TEMPERATURE: 97.6 F | HEART RATE: 59 BPM | RESPIRATION RATE: 20 BRPM

## 2023-02-17 DIAGNOSIS — E66.09 CLASS 1 OBESITY DUE TO EXCESS CALORIES IN ADULT, UNSPECIFIED BMI, UNSPECIFIED WHETHER SERIOUS COMORBIDITY PRESENT: ICD-10-CM

## 2023-02-17 DIAGNOSIS — Z71.89 ACP (ADVANCE CARE PLANNING): ICD-10-CM

## 2023-02-17 DIAGNOSIS — Z76.89 HEALTH CARE HOME: ICD-10-CM

## 2023-02-17 DIAGNOSIS — Z13.1 DIABETES MELLITUS SCREENING: ICD-10-CM

## 2023-02-17 DIAGNOSIS — Z00.00 ROUTINE PHYSICAL EXAMINATION: Primary | ICD-10-CM

## 2023-02-17 DIAGNOSIS — Z13.220 LIPID SCREENING: ICD-10-CM

## 2023-02-17 DIAGNOSIS — E66.811 CLASS 1 OBESITY DUE TO EXCESS CALORIES IN ADULT, UNSPECIFIED BMI, UNSPECIFIED WHETHER SERIOUS COMORBIDITY PRESENT: ICD-10-CM

## 2023-02-17 LAB
BUN SERPL-MCNC: 15 MG/DL (ref 7–25)
BUN/CREATININE RATIO: 13.9 (ref 6–22)
CALCIUM SERPL-MCNC: 9.4 MG/DL (ref 8.6–10.3)
CHLORIDE SERPLBLD-SCNC: 104.2 MMOL/L (ref 98–110)
CHOLEST SERPL-MCNC: 281 MG/DL (ref 0–199)
CHOLEST/HDLC SERPL: 6 {RATIO} (ref 0–5)
CO2 SERPL-SCNC: 29.9 MMOL/L (ref 20–32)
CREAT SERPL-MCNC: 1.08 MG/DL (ref 0.6–1.3)
GLUCOSE SERPL-MCNC: 90 MG/DL (ref 60–99)
HDLC SERPL-MCNC: 49 MG/DL (ref 40–150)
LDLC SERPL CALC-MCNC: 208 MG/DL (ref 0–130)
POTASSIUM SERPL-SCNC: 4.75 MMOL/L (ref 3.5–5.3)
SODIUM SERPL-SCNC: 142.9 MMOL/L (ref 135–146)
TRIGL SERPL-MCNC: 120 MG/DL (ref 0–149)

## 2023-02-17 PROCEDURE — 80061 LIPID PANEL: CPT | Performed by: STUDENT IN AN ORGANIZED HEALTH CARE EDUCATION/TRAINING PROGRAM

## 2023-02-17 PROCEDURE — 36415 COLL VENOUS BLD VENIPUNCTURE: CPT | Performed by: STUDENT IN AN ORGANIZED HEALTH CARE EDUCATION/TRAINING PROGRAM

## 2023-02-17 PROCEDURE — 80048 BASIC METABOLIC PNL TOTAL CA: CPT | Performed by: STUDENT IN AN ORGANIZED HEALTH CARE EDUCATION/TRAINING PROGRAM

## 2023-02-17 PROCEDURE — 99385 PREV VISIT NEW AGE 18-39: CPT | Performed by: STUDENT IN AN ORGANIZED HEALTH CARE EDUCATION/TRAINING PROGRAM

## 2023-02-17 ASSESSMENT — ANXIETY QUESTIONNAIRES
3. WORRYING TOO MUCH ABOUT DIFFERENT THINGS: NOT AT ALL
5. BEING SO RESTLESS THAT IT IS HARD TO SIT STILL: NOT AT ALL
IF YOU CHECKED OFF ANY PROBLEMS ON THIS QUESTIONNAIRE, HOW DIFFICULT HAVE THESE PROBLEMS MADE IT FOR YOU TO DO YOUR WORK, TAKE CARE OF THINGS AT HOME, OR GET ALONG WITH OTHER PEOPLE: NOT DIFFICULT AT ALL
GAD7 TOTAL SCORE: 1
GAD7 TOTAL SCORE: 1
1. FEELING NERVOUS, ANXIOUS, OR ON EDGE: SEVERAL DAYS
6. BECOMING EASILY ANNOYED OR IRRITABLE: NOT AT ALL
7. FEELING AFRAID AS IF SOMETHING AWFUL MIGHT HAPPEN: NOT AT ALL
2. NOT BEING ABLE TO STOP OR CONTROL WORRYING: NOT AT ALL

## 2023-02-17 ASSESSMENT — PATIENT HEALTH QUESTIONNAIRE - PHQ9
5. POOR APPETITE OR OVEREATING: NOT AT ALL
SUM OF ALL RESPONSES TO PHQ QUESTIONS 1-9: 0

## 2023-02-17 NOTE — LETTER
[facility]  [address]  [address]  [facility] [address]  [phone]      February 17, 2023      Jason Sanchez  [address] [address] [address]      EMERGENCY CARE PLAN  Presenting Problem Treatment Plan   Questions or concerns during clinic hours I will call the clinic directly:    [facility]  [address] , [address]  [place] [address]  [phone]   Questions or concerns outside clinic hours  I will call the 24 hour line at [phone]   Patient needs to schedule an appointment  I will call the  scheduling line at [phone]   Same day treatment   I will call the clinic first, then  urgent care and/or  express care if needed   Clinic Care Coordinators Adri Todd RN:  [phone]  [facility] Clinical Support Staff: [phone]    Crisis Services:  Behavioral or Mental Health P ([facility])   [phone]   Emergency treatment--Immediately CALL [phone]

## 2023-02-17 NOTE — NURSING NOTE
Chief Complaint   Patient presents with     Physical     Annual, fasting      New Patient     New patient to this clinic      Pre-visit Screening:  Immunizations:  up to date  Colonoscopy:  NA  Mammogram: NA  Asthma Action Test/Plan:  NA  PHQ9:  Given today-new pt   GAD7:  Given today-new pt   Questioned patient about current smoking habits Pt. has never smoked.  Ok to leave detailed message on voice mail for today's visit only Yes, phone # [phone]

## 2023-02-17 NOTE — PROGRESS NOTES
3  SUBJECTIVE:   CC: Jason Sanchez is an 27 year old male who presents for preventive health visit.     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 yes    Do you see a dentist twice per year? yes    Do you have sleep apnea, excessive snoring or daytime drowsiness?no    PHQ 2/17/2023   PHQ-9 Total Score 0   Q9: Thoughts of better off dead/self-harm past 2 weeks Not at all     TONI-7 SCORE 2/17/2023   Total Score 1           Do you feel safe in your environment? Yes        Social History     Tobacco Use     Smoking status: Never     Smokeless tobacco: Never   Substance Use Topics     Alcohol use: Yes     Alcohol/week: 2.0 standard drinks     Types: 2 Standard drinks or equivalent per week     Comment: once a week     If you drink alcohol do you typically have >3 drinks per day or >7 drinks per week? No                      Last PSA: No results found for: PSA    Reviewed orders with patient. Reviewed health maintenance and updated orders accordingly - Yes  Lab work is in process  Labs reviewed in EPIC  BP Readings from Last 3 Encounters:   02/17/23 110/88   09/07/21 116/79    Wt Readings from Last 3 Encounters:   02/17/23 112 kg (247 lb)   09/07/21 109.8 kg (242 lb)                    Reviewed and updated as needed this visit by clinical staff   Tobacco  Allergies  Meds  Problems             Reviewed and updated as needed this visit by Provider                 No past medical history on file.   Past Surgical History:   Procedure Laterality Date     LAPAROSCOPIC APPENDECTOMY N/A 9/7/2021    Procedure: APPENDECTOMY, LAPAROSCOPIC;  Surgeon: Alise Vargas,  "MD;  Location: [facility] OR     Family History   Problem Relation Age of Onset     No Known Problems Mother      Hypertension Father      Hyperlipidemia Father      No Known Problems Brother      No Known Problems Brother      Breast Cancer Maternal Grandmother      Cerebrovascular Disease Paternal Grandfather         aneurysm     Colon Cancer No family hx of      Coronary Artery Disease No family hx of      Diabetes No family hx of      Prostate Cancer No family hx of        ROS:  Rarely gets faster HR if increased caffiene or alcohol, never occurs otherwise. No CP or CLANCY, feels great when exercising. No syncope.   12 point ROS performed and negative for new concerns except as mentioned above     OBJECTIVE:   /88 (BP Location: Right arm, Patient Position: Sitting, Cuff Size: Adult Large)   Pulse 59   Temp 97.6  F (36.4  C) (Temporal)   Resp 20   Ht 1.854 m (6' 1\")   Wt 112 kg (247 lb)   SpO2 99%   BMI 32.59 kg/m    EXAM:  GENERAL: healthy, alert and no distress  EYES: Eyes grossly normal to inspection, PERRL and conjunctivae and sclerae normal  HENT: ear canals and TM's normal, nose and mouth without ulcers or lesions  NECK: no adenopathy, no asymmetry, masses, or scars and thyroid normal to palpation  RESP: lungs clear to auscultation - no rales, rhonchi or wheezes  CV: regular rate and rhythm, normal S1 S2, no S3 or S4, no murmur, click or rub, no peripheral edema and peripheral pulses strong  ABDOMEN: soft, nontender, no hepatosplenomegaly, no masses and bowel sounds normal   (male): normal male genitalia without lesions or urethral discharge, no hernia  MS: no gross musculoskeletal defects noted, no edema  SKIN: no suspicious lesions or rashes  NEURO: Normal strength and tone, mentation intact and speech normal  PSYCH: mentation appears normal, affect normal/bright    Diagnostic Test Results:  Labs reviewed in Epic    ASSESSMENT/PLAN:       ICD-10-CM    1. Routine physical examination  Z00.00       2. " "ACP (advance care planning)  Z71.89       3. Health Care Home  Z76.89       4. Class 1 obesity due to excess calories in adult, unspecified BMI, unspecified whether serious comorbidity present  E66.09 Lipid Panel (BFP)     Basic Metabolic Panel (BFP)      5. Lipid screening  Z13.220 Lipid Panel (BFP)      6. Diabetes mellitus screening  Z13.1 Basic Metabolic Panel (BFP)           Patient has been advised of split billing requirements and indicates understanding: Yes  COUNSELING:  Reviewed preventive health counseling, as reflected in patient instructions       Regular exercise       Healthy diet/nutrition       Vision screening       Hearing screening       Immunizations-declines all       Alcohol Use        Safe sex practices/STD prevention       Colorectal cancer screening       Prostate cancer screening    Estimated body mass index is 32.59 kg/m  as calculated from the following:    Height as of this encounter: 1.854 m (6' 1\").    Weight as of this encounter: 112 kg (247 lb).    Weight management plan: Discussed healthy diet and exercise guidelines    He reports that he has never smoked. He has never used smokeless tobacco.      Mil Gonzáles MD, [facility]    "

## 2024-02-20 ENCOUNTER — OFFICE VISIT (OUTPATIENT)
Dept: FAMILY MEDICINE | Facility: CLINIC | Age: 29
End: 2024-02-20

## 2024-02-20 VITALS
BODY MASS INDEX: 34.19 KG/M2 | TEMPERATURE: 98 F | HEART RATE: 69 BPM | HEIGHT: 73 IN | RESPIRATION RATE: 20 BRPM | SYSTOLIC BLOOD PRESSURE: 112 MMHG | WEIGHT: 258 LBS | DIASTOLIC BLOOD PRESSURE: 84 MMHG | OXYGEN SATURATION: 97 %

## 2024-02-20 DIAGNOSIS — E66.811 CLASS 1 OBESITY DUE TO EXCESS CALORIES IN ADULT, UNSPECIFIED BMI, UNSPECIFIED WHETHER SERIOUS COMORBIDITY PRESENT: ICD-10-CM

## 2024-02-20 DIAGNOSIS — E78.2 MIXED HYPERLIPIDEMIA: ICD-10-CM

## 2024-02-20 DIAGNOSIS — E66.09 CLASS 1 OBESITY DUE TO EXCESS CALORIES IN ADULT, UNSPECIFIED BMI, UNSPECIFIED WHETHER SERIOUS COMORBIDITY PRESENT: ICD-10-CM

## 2024-02-20 DIAGNOSIS — Z13.1 DIABETES MELLITUS SCREENING: ICD-10-CM

## 2024-02-20 DIAGNOSIS — Z00.00 ROUTINE PHYSICAL EXAMINATION: Primary | ICD-10-CM

## 2024-02-20 LAB
ALBUMIN SERPL-MCNC: 4.8 G/DL (ref 3.6–5.1)
ALBUMIN/GLOB SERPL: 1.7 {RATIO} (ref 1–2.5)
ALP SERPL-CCNC: 56 U/L (ref 33–130)
ALT 1742-6: 39 U/L (ref 0–32)
AST 1920-8: 18 U/L (ref 0–35)
BILIRUB SERPL-MCNC: 1.8 MG/DL (ref 0.2–1.2)
BUN SERPL-MCNC: 16 MG/DL (ref 7–25)
BUN/CREATININE RATIO: 16.2 (ref 6–32)
CALCIUM SERPL-MCNC: 9.5 MG/DL (ref 8.6–10.3)
CHLORIDE SERPLBLD-SCNC: 103.9 MMOL/L (ref 98–110)
CHOLEST SERPL-MCNC: 251 MG/DL (ref 0–199)
CHOLEST/HDLC SERPL: 6 {RATIO} (ref 0–5)
CO2 SERPL-SCNC: 30.5 MMOL/L (ref 20–32)
CREAT SERPL-MCNC: 0.99 MG/DL (ref 0.6–1.3)
GLOBULIN, CALCULATED - QUEST: 2.8 (ref 1.9–3.7)
GLUCOSE SERPL-MCNC: 100 MG/DL (ref 60–99)
HDLC SERPL-MCNC: 45 MG/DL (ref 40–150)
LDLC SERPL CALC-MCNC: 172 MG/DL (ref 0–130)
POTASSIUM SERPL-SCNC: 4.14 MMOL/L (ref 3.5–5.3)
PROT SERPL-MCNC: 7.6 G/DL (ref 6.1–8.1)
SODIUM SERPL-SCNC: 141.6 MMOL/L (ref 135–146)
TRIGL SERPL-MCNC: 171 MG/DL (ref 0–149)

## 2024-02-20 PROCEDURE — 80053 COMPREHEN METABOLIC PANEL: CPT | Performed by: STUDENT IN AN ORGANIZED HEALTH CARE EDUCATION/TRAINING PROGRAM

## 2024-02-20 PROCEDURE — 36415 COLL VENOUS BLD VENIPUNCTURE: CPT | Performed by: STUDENT IN AN ORGANIZED HEALTH CARE EDUCATION/TRAINING PROGRAM

## 2024-02-20 PROCEDURE — 80061 LIPID PANEL: CPT | Performed by: STUDENT IN AN ORGANIZED HEALTH CARE EDUCATION/TRAINING PROGRAM

## 2024-02-20 PROCEDURE — 99395 PREV VISIT EST AGE 18-39: CPT | Performed by: STUDENT IN AN ORGANIZED HEALTH CARE EDUCATION/TRAINING PROGRAM

## 2024-02-20 NOTE — PROGRESS NOTES
"  SUBJECTIVE:   CC: Jason Sanchez is an 28 year old male who presents for preventive health visit.     Patient has been advised of split billing requirements and indicates understanding: Yes    Nursing Notes:   Alise York CMA  2/20/2024  8:16 AM  Signed  Chief Complaint   Patient presents with    Physical     Annual, fasting      Pre-visit Screening:  Immunizations:  not up to date - due for 2nd HPV shot   Colonoscopy:  na  Mammogram: na  Asthma Action Test/Plan:  na  PHQ9:  phq2 done today   GAD7:  na  Questioned patient about current smoking habits Pt. has never smoked.  Ok to leave detailed message on voice mail for today's visit only yes, phone # [phone] (home)        Healthy Habits:  Diet: variable   Exercise: goes in phases, more in nice weather  Sleep: no concerns   Mental Health: no concerns      3rd child born last month    Problems taking medications regularly not applicable   Have you had an eye exam in the past two years? yes  Do you see a dentist twice per year? yes  Do you have sleep apnea, excessive snoring or daytime drowsiness?no      Today's PHQ-2 Score:       2/20/2024     8:14 AM   PHQ-2 ( 1999 [employer])   Q1: Little interest or pleasure in doing things 0   Q2: Feeling down, depressed or hopeless 0   PHQ-2 Score 0       Do you feel safe in your environment? Yes        Social History     Tobacco Use    Smoking status: Never    Smokeless tobacco: Never   Substance Use Topics    Alcohol use: Yes     Alcohol/week: 5.0 standard drinks of alcohol     Types: 5 Standard drinks or equivalent per week     Comment: social     If you drink alcohol do you typically have >3 drinks per day or >7 drinks per week? No                      Last PSA: No results found for: \"PSA\"    Reviewed orders with patient. Reviewed health maintenance and updated orders accordingly - Yes  Lab work is in process  Labs reviewed in EPIC  BP Readings from Last 3 Encounters:   02/20/24 112/84   02/17/23 110/88   09/07/21 " "116/79    Wt Readings from Last 3 Encounters:   02/20/24 117 kg (258 lb)   02/17/23 112 kg (247 lb)   09/07/21 109.8 kg (242 lb)                    Reviewed and updated as needed this visit by clinical staff   Tobacco  Allergies  Meds              Reviewed and updated as needed this visit by Provider                  No past medical history on file.   Past Surgical History:   Procedure Laterality Date    LAPAROSCOPIC APPENDECTOMY N/A 9/7/2021    Procedure: APPENDECTOMY, LAPAROSCOPIC;  Surgeon: Alise Vargas MD;  Location: [facility] OR     Family History   Problem Relation Age of Onset    No Known Problems Mother     Hypertension Father     Hyperlipidemia Father     No Known Problems Brother     No Known Problems Brother     Breast Cancer Maternal Grandmother     Cerebrovascular Disease Paternal Grandfather         aneurysm    Colon Cancer No family hx of     Coronary Artery Disease No family hx of     Diabetes No family hx of     Prostate Cancer No family hx of        ROS:  12 point ROS performed and negative for new concerns except as mentioned above     OBJECTIVE:   /84 (BP Location: Left arm, Patient Position: Sitting, Cuff Size: Adult Large)   Pulse 69   Temp 98  F (36.7  C) (Temporal)   Resp 20   Ht 1.854 m (6' 1\")   Wt 117 kg (258 lb)   SpO2 97%   BMI 34.04 kg/m    EXAM:  GENERAL: alert and no distress  EYES: Eyes grossly normal to inspection, PERRL and conjunctivae and sclerae normal  HENT: ear canals and TM's normal, nose and mouth without ulcers or lesions  NECK: no adenopathy, no asymmetry, masses, or scars  RESP: lungs clear to auscultation - no rales, rhonchi or wheezes  CV: regular rate and rhythm, normal S1 S2, no S3 or S4, no murmur, click or rub, no peripheral edema  ABDOMEN: soft, nontender, no hepatosplenomegaly, no masses and bowel sounds normal  MS: no gross musculoskeletal defects noted, no edema  SKIN: no suspicious lesions or rashes  NEURO: Normal strength and tone, mentation " "intact and speech normal  PSYCH: mentation appears normal, affect normal/bright    Diagnostic Test Results:  Labs reviewed in Epic    ASSESSMENT/PLAN:       ICD-10-CM    1. Routine physical examination  Z00.00       2. Diabetes mellitus screening  Z13.1 Comprehensive Metobolic Panel ([facility])      3. Class 1 obesity due to excess calories in adult, unspecified BMI, unspecified whether serious comorbidity present  E66.09 Comprehensive Metobolic Panel ([facility])      4. Mixed hyperlipidemia  E78.2 Lipid Panel ([facility])           Patient has been advised of split billing requirements and indicates understanding: Yes  COUNSELING:  Reviewed preventive health counseling, as reflected in patient instructions       Regular exercise       Healthy diet/nutrition       Vision screening       Immunizations       Alcohol Use        Advance Care Planning    Estimated body mass index is 34.04 kg/m  as calculated from the following:    Height as of this encounter: 1.854 m (6' 1\").    Weight as of this encounter: 117 kg (258 lb).    Weight management plan: Discussed healthy diet and exercise guidelines    He reports that he has never smoked. He has never used smokeless tobacco.      Patient Instructions   Blood work today    Follow-up yearly, sooner if needed      Mil Gonzáles MD, [facility]   "

## 2024-02-20 NOTE — NURSING NOTE
Chief Complaint   Patient presents with    Physical     Annual, fasting      Pre-visit Screening:  Immunizations:  not up to date - due for 2nd HPV shot   Colonoscopy:  na  Mammogram: na  Asthma Action Test/Plan:  na  PHQ9:  phq2 done today   GAD7:  na  Questioned patient about current smoking habits Pt. has never smoked.  Ok to leave detailed message on voice mail for today's visit only yes, phone # [phone] (home)

## 2024-06-17 PROBLEM — Z76.89 HEALTH CARE HOME: Status: RESOLVED | Noted: 2023-02-17 | Resolved: 2024-06-17

## 2025-03-30 ENCOUNTER — HEALTH MAINTENANCE LETTER (OUTPATIENT)
Age: 30
End: 2025-03-30

## (undated) DEVICE — LINEN POUCH DBL 5427

## (undated) DEVICE — GOWN IMPERVIOUS ZONED XLG 9041

## (undated) DEVICE — LINEN FULL SHEET 5511

## (undated) DEVICE — BLADE CLIPPER 3M 9670

## (undated) DEVICE — ENDO POUCH UNIV RETRIEVAL SYSTEM INZII 10MM CD001

## (undated) DEVICE — Device

## (undated) DEVICE — ESU PENCIL W/HOLSTER E2350H

## (undated) DEVICE — ENDO TROCAR BLUNT TIP KII BALLOON 12X100MM C0R47

## (undated) DEVICE — GLOVE PROTEXIS W/NEU-THERA 6.5  2D73TE65

## (undated) DEVICE — LINEN TOWEL PACK X10 5473

## (undated) DEVICE — ADH SKIN CLOSURE PREMIERPRO EXOFIN MICOR HV 0.5ML 3471

## (undated) DEVICE — ENDO TROCAR SLEEVE KII Z-THREADED 05X100MM CTS02

## (undated) DEVICE — ESU CORD MONOPOLAR 10'  E0510

## (undated) DEVICE — SU VICRYL 0 UR-6 27" J603H

## (undated) DEVICE — BAG CLEAR TRASH 1.3M 39X33" P4040C

## (undated) DEVICE — SOL NACL 0.9% IRRIG 1000ML BOTTLE 2F7124

## (undated) DEVICE — STPL POWERED ECHELON 45MM PSEE45A

## (undated) DEVICE — ENDO TROCAR FIRST ENTRY KII FIOS Z-THRD 05X100MM CTF03

## (undated) DEVICE — ESU ELEC BLADE 2.75" COATED/INSULATED E1455

## (undated) DEVICE — GLOVE PROTEXIS BLUE W/NEU-THERA 6.5  2D73EB65

## (undated) DEVICE — ESU GROUND PAD ADULT W/CORD E7507

## (undated) DEVICE — SU VICRYL 4-0 PS-2 18" UND J496H

## (undated) DEVICE — LINEN HALF SHEET 5512

## (undated) RX ORDER — ACETAMINOPHEN 325 MG/1
TABLET ORAL
Status: DISPENSED
Start: 2021-09-07

## (undated) RX ORDER — KETOROLAC TROMETHAMINE 30 MG/ML
INJECTION, SOLUTION INTRAMUSCULAR; INTRAVENOUS
Status: DISPENSED
Start: 2021-09-07

## (undated) RX ORDER — OXYCODONE HYDROCHLORIDE 5 MG/1
TABLET ORAL
Status: DISPENSED
Start: 2021-09-07

## (undated) RX ORDER — FENTANYL CITRATE 50 UG/ML
INJECTION, SOLUTION INTRAMUSCULAR; INTRAVENOUS
Status: DISPENSED
Start: 2021-09-07

## (undated) RX ORDER — BUPIVACAINE HYDROCHLORIDE 5 MG/ML
INJECTION, SOLUTION EPIDURAL; INTRACAUDAL
Status: DISPENSED
Start: 2021-09-07